# Patient Record
Sex: MALE | Race: WHITE | NOT HISPANIC OR LATINO | URBAN - METROPOLITAN AREA
[De-identification: names, ages, dates, MRNs, and addresses within clinical notes are randomized per-mention and may not be internally consistent; named-entity substitution may affect disease eponyms.]

---

## 2020-07-25 ENCOUNTER — TELEPHONE ENCOUNTER (OUTPATIENT)
Dept: URBAN - METROPOLITAN AREA CLINIC 13 | Facility: CLINIC | Age: 85
End: 2020-07-25

## 2020-07-25 RX ORDER — FAMOTIDINE 40 MG/1
TAKE 1 TABLET TWICE DAILY TABLET ORAL
Qty: 60 | Refills: 3 | OUTPATIENT
Start: 2012-12-07 | End: 2013-04-05

## 2020-07-25 RX ORDER — DIGOXIN 250 UG/1
TAKE 1 TABLET DAILY TABLET ORAL
Refills: 0 | OUTPATIENT
End: 2019-07-31

## 2020-07-25 RX ORDER — GLIMEPIRIDE 4 MG/1
TAKE 1 TABLET DAILY AS DIRECTED TABLET ORAL
Refills: 0 | OUTPATIENT
Start: 2013-01-02 | End: 2019-07-31

## 2020-07-25 RX ORDER — CYCLOBENZAPRINE HYDROCHLORIDE 5 MG/1
TAKE 1 TABLET BY MOUTH AT BEDTIME FOR 7 DAYS TABLET, FILM COATED ORAL
Qty: 7 | Refills: 0 | OUTPATIENT
Start: 2019-07-26 | End: 2019-07-31

## 2020-07-25 RX ORDER — FAMOTIDINE 40 MG/1
TAKE 1 TABLET TWICE DAILY TABLET ORAL
Qty: 180 | Refills: 3 | OUTPATIENT
Start: 2013-02-04 | End: 2015-03-10

## 2020-07-25 RX ORDER — POLYETHYLENE GLYCOL 3350, SODIUM SULFATE, SODIUM CHLORIDE, POTASSIUM CHLORIDE, ASCORBIC ACID, SODIUM ASCORBATE 7.5-2.691G
TAKE 32 OZ AS DIRECTED 5:00PM THE EVENING BEFORE AND 6HR PRIOR TO PROCEDURE KIT ORAL
Qty: 1 | Refills: 0 | OUTPATIENT
Start: 2013-04-24 | End: 2013-05-17

## 2020-07-25 RX ORDER — DEXLANSOPRAZOLE 60 MG/1
TAKE 1 CAPSULE DAILY CAPSULE, DELAYED RELEASE ORAL
Refills: 0 | OUTPATIENT
Start: 2013-04-02 | End: 2015-03-10

## 2020-07-25 RX ORDER — GLIMEPIRIDE 2 MG/1
TAKE 1 TABLET DAILY AS DIRECTED TABLET ORAL
Refills: 0 | OUTPATIENT
End: 2013-02-04

## 2020-07-25 RX ORDER — PANTOPRAZOLE SODIUM 40 MG/1
TAKE 1 TABLET DAILY TABLET, DELAYED RELEASE ORAL
Qty: 90 | Refills: 3 | OUTPATIENT
Start: 2019-07-31 | End: 2019-11-04

## 2020-07-25 RX ORDER — ATENOLOL 50 MG/1
TAKE 1 TABLET DAILY TABLET ORAL
Refills: 0 | OUTPATIENT
End: 2019-07-31

## 2020-07-25 RX ORDER — OMEGA-3-ACID ETHYL ESTERS 1 G/1
TAKE 1 CAPSULE DAILY CAPSULE, LIQUID FILLED ORAL
Refills: 0 | OUTPATIENT
End: 2019-07-31

## 2020-07-25 RX ORDER — BETAMETHASONE DIPROPIONATE 0.5 MG/G
RUB IN AS NEEDED CREAM TOPICAL
Qty: 50 | Refills: 0 | OUTPATIENT
Start: 2013-03-31 | End: 2015-03-10

## 2020-07-25 RX ORDER — AMOXICILLIN AND CLAVULANATE POTASSIUM 875; 125 MG/1; MG/1
TAKE 1 TABLET TWICE DAILY UNTIL GONE TABLET, FILM COATED ORAL
Refills: 0 | OUTPATIENT
Start: 2015-03-05 | End: 2015-03-26

## 2020-07-25 RX ORDER — POLYETHYLENE GLYCOL 3350, SODIUM CHLORIDE, SODIUM BICARBONATE AND POTASSIUM CHLORIDE WITH LEMON FLAVOR 420; 11.2; 5.72; 1.48 G/4L; G/4L; G/4L; G/4L
USE AS DIRECTED POWDER, FOR SOLUTION ORAL
Qty: 1 | Refills: 0 | OUTPATIENT
Start: 2019-07-31 | End: 2019-08-05

## 2020-07-25 RX ORDER — OMEPRAZOLE 40 MG/1
TAKE 1 CAPSULE DAILY CAPSULE, DELAYED RELEASE ORAL
Qty: 30 | Refills: 5 | OUTPATIENT
Start: 2012-09-17 | End: 2012-11-07

## 2020-07-25 RX ORDER — POLYETHYLENE GLYCOL 3350, SODIUM SULFATE, SODIUM CHLORIDE, POTASSIUM CHLORIDE, ASCORBIC ACID, SODIUM ASCORBATE 7.5-2.691G
USE AS DIRECTED KIT ORAL
Qty: 1 | Refills: 0 | OUTPATIENT
Start: 2012-09-17 | End: 2012-11-07

## 2020-07-26 ENCOUNTER — TELEPHONE ENCOUNTER (OUTPATIENT)
Dept: URBAN - METROPOLITAN AREA CLINIC 13 | Facility: CLINIC | Age: 85
End: 2020-07-26

## 2020-07-26 RX ORDER — AZITHROMYCIN DIHYDRATE 250 MG/1
TABLET, FILM COATED ORAL
Qty: 6 | Refills: 0 | Status: ACTIVE | COMMUNITY
Start: 2018-12-17

## 2020-07-26 RX ORDER — FUROSEMIDE 40 MG/1
TABLET ORAL
Qty: 180 | Refills: 0 | Status: ACTIVE | COMMUNITY
Start: 2012-02-17

## 2020-07-26 RX ORDER — BROMPHENIRAMINE MALEATE, PSEUDOEPHEDRINE HYDROCHLORIDE, 2; 30; 10 MG/5ML; MG/5ML; MG/5ML
SYRUP ORAL
Qty: 240 | Refills: 0 | Status: ACTIVE | COMMUNITY
Start: 2018-12-17

## 2020-07-26 RX ORDER — OMEGA-3-ACID ETHYL ESTERS 1 G/1
CAPSULE, LIQUID FILLED ORAL
Qty: 255 | Refills: 0 | Status: ACTIVE | COMMUNITY
Start: 2011-11-19

## 2020-07-26 RX ORDER — ASPIRIN 81 MG/1
TAKE 1 TABLET DAILY AS DIRECTED TABLET, DELAYED RELEASE ORAL
Refills: 0 | Status: ACTIVE | COMMUNITY

## 2020-07-26 RX ORDER — COLESEVELAM HYDROCHLORIDE 3.75 G/1
TAKE 1 PACKET DAILY FOR SUSPENSION ORAL
Refills: 0 | Status: ACTIVE | COMMUNITY

## 2020-07-26 RX ORDER — SOTALOL HYDROCHLORIDE 120 MG/1
TAKE 1 TABLET TWICE DAILY TABLET ORAL
Refills: 0 | Status: ACTIVE | COMMUNITY
Start: 2019-01-20

## 2020-07-26 RX ORDER — FUROSEMIDE 40 MG/1
TAKE 1 TABLET DAILY TABLET ORAL
Refills: 0 | Status: ACTIVE | COMMUNITY

## 2020-07-26 RX ORDER — ATENOLOL 50 MG/1
TABLET ORAL
Qty: 90 | Refills: 0 | Status: ACTIVE | COMMUNITY
Start: 2011-11-28

## 2020-07-26 RX ORDER — COLESEVELAM HYDROCHLORIDE 3.75 G/1
FOR SUSPENSION ORAL
Qty: 90 | Refills: 0 | Status: ACTIVE | COMMUNITY
Start: 2014-06-24

## 2020-07-26 RX ORDER — GLIMEPIRIDE 2 MG/1
TABLET ORAL
Qty: 90 | Refills: 0 | Status: ACTIVE | COMMUNITY
Start: 2011-12-23

## 2020-07-26 RX ORDER — PANTOPRAZOLE SODIUM 40 MG/1
TAKE 1 TABLET DAILY TABLET, DELAYED RELEASE ORAL
Refills: 11 | Status: ACTIVE | COMMUNITY
Start: 2019-11-04

## 2020-07-26 RX ORDER — MELOXICAM 7.5 MG/1
TABLET ORAL
Qty: 180 | Refills: 0 | Status: ACTIVE | COMMUNITY
Start: 2012-01-24

## 2020-07-26 RX ORDER — MELOXICAM 7.5 MG/1
TABLET ORAL
Qty: 180 | Refills: 0 | Status: ACTIVE | COMMUNITY
Start: 2011-09-28

## 2020-07-26 RX ORDER — APIXABAN 5 MG/1
TABLET, FILM COATED ORAL
Refills: 0 | Status: ACTIVE | COMMUNITY
Start: 2019-03-13

## 2020-07-26 RX ORDER — MELOXICAM 7.5 MG/1
TABLET ORAL
Qty: 180 | Refills: 0 | Status: ACTIVE | COMMUNITY
Start: 2012-04-26

## 2020-08-31 ENCOUNTER — TELEPHONE ENCOUNTER (OUTPATIENT)
Dept: URBAN - METROPOLITAN AREA CLINIC 113 | Facility: CLINIC | Age: 85
End: 2020-08-31

## 2020-08-31 RX ORDER — PANTOPRAZOLE SODIUM 40 MG/1
TAKE 1 TABLET DAILY TABLET, DELAYED RELEASE ORAL ONCE A DAY
Qty: 90 | Refills: 1
Start: 2019-11-04

## 2021-03-04 ENCOUNTER — ERX REFILL RESPONSE (OUTPATIENT)
Dept: URBAN - METROPOLITAN AREA CLINIC 107 | Facility: CLINIC | Age: 86
End: 2021-03-04

## 2021-03-04 RX ORDER — PANTOPRAZOLE SODIUM 40 MG/1
1 TABLET TABLET, DELAYED RELEASE ORAL ONCE A DAY
Qty: 90 TABLET | Refills: 1

## 2021-08-29 ENCOUNTER — ERX REFILL RESPONSE (OUTPATIENT)
Dept: URBAN - METROPOLITAN AREA CLINIC 107 | Facility: CLINIC | Age: 86
End: 2021-08-29

## 2021-08-29 RX ORDER — PANTOPRAZOLE SODIUM 40 MG/1
1 TABLET TABLET, DELAYED RELEASE ORAL ONCE A DAY
Qty: 90 TABLET | Refills: 1 | OUTPATIENT

## 2021-08-29 RX ORDER — PANTOPRAZOLE SODIUM 40 MG/1
TAKE 1 TABLET EVERY DAY TABLET, DELAYED RELEASE ORAL
Qty: 90 TABLET | Refills: 1 | OUTPATIENT

## 2022-12-12 ENCOUNTER — OFFICE VISIT (OUTPATIENT)
Dept: URBAN - METROPOLITAN AREA CLINIC 113 | Facility: CLINIC | Age: 87
End: 2022-12-12
Payer: MEDICARE

## 2022-12-12 ENCOUNTER — LAB OUTSIDE AN ENCOUNTER (OUTPATIENT)
Dept: URBAN - METROPOLITAN AREA CLINIC 113 | Facility: CLINIC | Age: 87
End: 2022-12-12

## 2022-12-12 ENCOUNTER — WEB ENCOUNTER (OUTPATIENT)
Dept: URBAN - METROPOLITAN AREA CLINIC 113 | Facility: CLINIC | Age: 87
End: 2022-12-12

## 2022-12-12 VITALS
BODY MASS INDEX: 32.13 KG/M2 | HEIGHT: 68 IN | TEMPERATURE: 97.5 F | WEIGHT: 212 LBS | SYSTOLIC BLOOD PRESSURE: 132 MMHG | RESPIRATION RATE: 16 BRPM | DIASTOLIC BLOOD PRESSURE: 62 MMHG | HEART RATE: 50 BPM

## 2022-12-12 DIAGNOSIS — Z79.01 CHRONIC ANTICOAGULATION: ICD-10-CM

## 2022-12-12 DIAGNOSIS — K57.30 DIVERTICULOSIS LARGE INTESTINE W/O PERFORATION OR ABSCESS W/O BLEEDING: ICD-10-CM

## 2022-12-12 DIAGNOSIS — C16.0 ADENOCARCINOMA OF GASTRIC CARDIA: ICD-10-CM

## 2022-12-12 DIAGNOSIS — Z86.010 HISTORY OF ADENOMATOUS POLYP OF COLON: ICD-10-CM

## 2022-12-12 PROCEDURE — 99203 OFFICE O/P NEW LOW 30 MIN: CPT | Performed by: INTERNAL MEDICINE

## 2022-12-12 RX ORDER — ASPIRIN 81 MG/1
TAKE 1 TABLET DAILY AS DIRECTED TABLET, DELAYED RELEASE ORAL
Refills: 0 | Status: ACTIVE | COMMUNITY

## 2022-12-12 RX ORDER — OMEGA-3-ACID ETHYL ESTERS 1 G/1
CAPSULE, LIQUID FILLED ORAL
Qty: 255 | Refills: 0 | Status: ON HOLD | COMMUNITY
Start: 2011-11-19

## 2022-12-12 RX ORDER — SOTALOL HYDROCHLORIDE 120 MG/1
TAKE 1 TABLET TWICE DAILY TABLET ORAL
Refills: 0 | Status: ACTIVE | COMMUNITY
Start: 2019-01-20

## 2022-12-12 RX ORDER — GLIMEPIRIDE 2 MG/1
TABLET ORAL
Qty: 90 | Refills: 0 | Status: ON HOLD | COMMUNITY
Start: 2011-12-23

## 2022-12-12 RX ORDER — ATENOLOL 50 MG/1
TABLET ORAL
Qty: 90 | Refills: 0 | Status: ON HOLD | COMMUNITY
Start: 2011-11-28

## 2022-12-12 RX ORDER — BROMPHENIRAMINE MALEATE, PSEUDOEPHEDRINE HYDROCHLORIDE, 2; 30; 10 MG/5ML; MG/5ML; MG/5ML
SYRUP ORAL
Qty: 240 | Refills: 0 | Status: ON HOLD | COMMUNITY
Start: 2018-12-17

## 2022-12-12 RX ORDER — METFORMIN HYDROCHLORIDE 500 MG/1
1 TABLET WITH A MEAL TABLET, FILM COATED ORAL ONCE A DAY
Status: ACTIVE | COMMUNITY

## 2022-12-12 RX ORDER — PANTOPRAZOLE SODIUM 40 MG/1
TAKE 1 TABLET EVERY DAY TABLET, DELAYED RELEASE ORAL
Qty: 90 TABLET | Refills: 1 | Status: ACTIVE | COMMUNITY

## 2022-12-12 RX ORDER — APIXABAN 5 MG/1
TABLET, FILM COATED ORAL
Refills: 0 | Status: ACTIVE | COMMUNITY
Start: 2019-03-13

## 2022-12-12 RX ORDER — COLESEVELAM HYDROCHLORIDE 3.75 G/1
TAKE 1 PACKET DAILY FOR SUSPENSION ORAL
Refills: 0 | Status: ON HOLD | COMMUNITY

## 2022-12-12 RX ORDER — OMEGA-3 FATTY ACIDS/FISH OIL 300-1000MG
1 CAPSULE CAPSULE ORAL ONCE A DAY
Status: ACTIVE | COMMUNITY

## 2022-12-12 RX ORDER — MELOXICAM 7.5 MG/1
TABLET ORAL
Qty: 180 | Refills: 0 | Status: ON HOLD | COMMUNITY
Start: 2011-09-28

## 2022-12-12 RX ORDER — FUROSEMIDE 40 MG/1
TAKE 1 TABLET DAILY TABLET ORAL
Refills: 0 | Status: ACTIVE | COMMUNITY

## 2022-12-12 RX ORDER — AZITHROMYCIN DIHYDRATE 250 MG/1
TABLET, FILM COATED ORAL
Qty: 6 | Refills: 0 | Status: ON HOLD | COMMUNITY
Start: 2018-12-17

## 2022-12-12 NOTE — HPI-OTHER HISTORIES
EGD on 8/7/2019 revealed a normal esophagus and Z-line at 42 cm.  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  The duodenum was normal.  Colonoscopy on 8/5/2019 revealed pancolonic diverticulosis, small grade 2 internal hemorrhoids, no colon polyps.

## 2022-12-12 NOTE — HPI-TODAY'S VISIT:
86-year-old with a history of atrial fibrillation on Eliquis, hypertension, hyperlipidemia, chronic kidney disease stage III, neuropathy, history of adenomatous colon polyps and abdominal aortic aneurysm 4.1 cm in size and diabetes mellitus.  Upper endoscopy on 8/7/2019 revealed small erythematous area 1.5 cm in size in the gastric cardia below the GE junction biopsies showed moderately differentiated adenocarcinoma.  He subsequently underwent an endoscopic mucosal dissection at the [facility] with Dr. Hernandez.  This revealed a 1.5 cm GE junctional adenocarcinoma/cardia margins were negative.  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  He underwent repeat surveillance and endoscopic ultrasound examination on 1/31/2020 multiple biopsies of the region were negative there was a 12 mm lymph node adjacent to the GE junction needle biopsy revealed reactive lymphocytosis. Blood work on 8/1/2022 revealed hemoglobin 11, WBC of 7.3 and platelet count 217,000.  Sodium 143 potassium 4.7 BUN 46 creatinine 1.9.  AST 16, ALT 19, alkaline phosphatase 82, total bili 0.3, TSH is 4.33.

## 2023-02-08 ENCOUNTER — OFFICE VISIT (OUTPATIENT)
Dept: URBAN - METROPOLITAN AREA SURGERY CENTER 25 | Facility: SURGERY CENTER | Age: 88
End: 2023-02-08
Payer: MEDICARE

## 2023-02-08 ENCOUNTER — CLAIMS CREATED FROM THE CLAIM WINDOW (OUTPATIENT)
Dept: URBAN - METROPOLITAN AREA CLINIC 4 | Facility: CLINIC | Age: 88
End: 2023-02-08
Payer: MEDICARE

## 2023-02-08 DIAGNOSIS — Z09 EXAMINATION FOR, FOLLOW-UP: ICD-10-CM

## 2023-02-08 DIAGNOSIS — K31.89 OTHER DISEASES OF STOMACH AND DUODENUM: ICD-10-CM

## 2023-02-08 DIAGNOSIS — D50.9 ANEMIA, IRON DEFICIENCY: ICD-10-CM

## 2023-02-08 DIAGNOSIS — Z85.028 HISTORY OF GASTRIC CANCER: ICD-10-CM

## 2023-02-08 DIAGNOSIS — K31.89 GASTRIC FOVEOLAR HYPERPLASIA: ICD-10-CM

## 2023-02-08 PROCEDURE — 88305 TISSUE EXAM BY PATHOLOGIST: CPT | Performed by: PATHOLOGY

## 2023-02-08 PROCEDURE — 43239 EGD BIOPSY SINGLE/MULTIPLE: CPT | Performed by: INTERNAL MEDICINE

## 2023-02-08 PROCEDURE — 88312 SPECIAL STAINS GROUP 1: CPT | Performed by: PATHOLOGY

## 2023-02-08 PROCEDURE — G8907 PT DOC NO EVENTS ON DISCHARG: HCPCS | Performed by: INTERNAL MEDICINE

## 2023-02-08 RX ORDER — COLESEVELAM HYDROCHLORIDE 3.75 G/1
TAKE 1 PACKET DAILY FOR SUSPENSION ORAL
Refills: 0 | Status: ON HOLD | COMMUNITY

## 2023-02-08 RX ORDER — PANTOPRAZOLE SODIUM 40 MG/1
TAKE 1 TABLET EVERY DAY TABLET, DELAYED RELEASE ORAL
Qty: 90 TABLET | Refills: 1 | Status: ACTIVE | COMMUNITY

## 2023-02-08 RX ORDER — ATENOLOL 50 MG/1
TABLET ORAL
Qty: 90 | Refills: 0 | Status: ON HOLD | COMMUNITY
Start: 2011-11-28

## 2023-02-08 RX ORDER — FUROSEMIDE 40 MG/1
TAKE 1 TABLET DAILY TABLET ORAL
Refills: 0 | Status: ACTIVE | COMMUNITY

## 2023-02-08 RX ORDER — METFORMIN HYDROCHLORIDE 500 MG/1
1 TABLET WITH A MEAL TABLET, FILM COATED ORAL ONCE A DAY
Status: ACTIVE | COMMUNITY

## 2023-02-08 RX ORDER — BROMPHENIRAMINE MALEATE, PSEUDOEPHEDRINE HYDROCHLORIDE, 2; 30; 10 MG/5ML; MG/5ML; MG/5ML
SYRUP ORAL
Qty: 240 | Refills: 0 | Status: ON HOLD | COMMUNITY
Start: 2018-12-17

## 2023-02-08 RX ORDER — GLIMEPIRIDE 2 MG/1
TABLET ORAL
Qty: 90 | Refills: 0 | Status: ON HOLD | COMMUNITY
Start: 2011-12-23

## 2023-02-08 RX ORDER — OMEGA-3 FATTY ACIDS/FISH OIL 300-1000MG
1 CAPSULE CAPSULE ORAL ONCE A DAY
Status: ACTIVE | COMMUNITY

## 2023-02-08 RX ORDER — OMEGA-3-ACID ETHYL ESTERS 1 G/1
CAPSULE, LIQUID FILLED ORAL
Qty: 255 | Refills: 0 | Status: ON HOLD | COMMUNITY
Start: 2011-11-19

## 2023-02-08 RX ORDER — APIXABAN 5 MG/1
TABLET, FILM COATED ORAL
Refills: 0 | Status: ACTIVE | COMMUNITY
Start: 2019-03-13

## 2023-02-08 RX ORDER — AZITHROMYCIN DIHYDRATE 250 MG/1
TABLET, FILM COATED ORAL
Qty: 6 | Refills: 0 | Status: ON HOLD | COMMUNITY
Start: 2018-12-17

## 2023-02-08 RX ORDER — SOTALOL HYDROCHLORIDE 120 MG/1
TAKE 1 TABLET TWICE DAILY TABLET ORAL
Refills: 0 | Status: ACTIVE | COMMUNITY
Start: 2019-01-20

## 2023-02-08 RX ORDER — MELOXICAM 7.5 MG/1
TABLET ORAL
Qty: 180 | Refills: 0 | Status: ON HOLD | COMMUNITY
Start: 2011-09-28

## 2023-02-08 RX ORDER — ASPIRIN 81 MG/1
TAKE 1 TABLET DAILY AS DIRECTED TABLET, DELAYED RELEASE ORAL
Refills: 0 | Status: ACTIVE | COMMUNITY

## 2023-02-27 PROBLEM — 87522002 IRON DEFICIENCY ANEMIA: Status: ACTIVE | Noted: 2023-02-27

## 2023-02-27 PROBLEM — 473061005 HISTORY OF MALIGNANT NEOPLASM OF STOMACH: Status: ACTIVE | Noted: 2023-02-27

## 2023-02-28 PROBLEM — 429047008: Status: ACTIVE | Noted: 2022-12-11

## 2023-02-28 PROBLEM — 711150003: Status: ACTIVE | Noted: 2022-12-12

## 2023-02-28 PROBLEM — 408647009: Status: ACTIVE | Noted: 2022-12-11

## 2023-02-28 PROBLEM — 724538004: Status: ACTIVE | Noted: 2022-12-11

## 2023-03-01 ENCOUNTER — OFFICE VISIT (OUTPATIENT)
Dept: URBAN - METROPOLITAN AREA CLINIC 107 | Facility: CLINIC | Age: 88
End: 2023-03-01
Payer: MEDICARE

## 2023-03-01 VITALS
HEART RATE: 53 BPM | WEIGHT: 211 LBS | HEIGHT: 68 IN | TEMPERATURE: 97.7 F | BODY MASS INDEX: 31.98 KG/M2 | DIASTOLIC BLOOD PRESSURE: 63 MMHG | SYSTOLIC BLOOD PRESSURE: 112 MMHG

## 2023-03-01 DIAGNOSIS — K57.30 DIVERTICULOSIS LARGE INTESTINE W/O PERFORATION OR ABSCESS W/O BLEEDING: ICD-10-CM

## 2023-03-01 DIAGNOSIS — Z86.010 HISTORY OF ADENOMATOUS POLYP OF COLON: ICD-10-CM

## 2023-03-01 DIAGNOSIS — Z79.01 CHRONIC ANTICOAGULATION: ICD-10-CM

## 2023-03-01 DIAGNOSIS — C16.0 ADENOCARCINOMA OF GASTRIC CARDIA: ICD-10-CM

## 2023-03-01 PROCEDURE — 99212 OFFICE O/P EST SF 10 MIN: CPT | Performed by: INTERNAL MEDICINE

## 2023-03-01 RX ORDER — ASPIRIN 81 MG/1
TAKE 1 TABLET DAILY AS DIRECTED TABLET, DELAYED RELEASE ORAL
Refills: 0 | Status: ACTIVE | COMMUNITY

## 2023-03-01 RX ORDER — OMEGA-3 FATTY ACIDS/FISH OIL 300-1000MG
1 CAPSULE CAPSULE ORAL ONCE A DAY
Status: ACTIVE | COMMUNITY

## 2023-03-01 RX ORDER — PANTOPRAZOLE SODIUM 40 MG/1
TAKE 1 TABLET EVERY DAY TABLET, DELAYED RELEASE ORAL
Qty: 90 TABLET | Refills: 1 | Status: ACTIVE | COMMUNITY

## 2023-03-01 RX ORDER — FUROSEMIDE 40 MG/1
TAKE 1 TABLET DAILY TABLET ORAL
Refills: 0 | Status: ACTIVE | COMMUNITY

## 2023-03-01 RX ORDER — AZITHROMYCIN DIHYDRATE 250 MG/1
TABLET, FILM COATED ORAL
Qty: 6 | Refills: 0 | Status: ON HOLD | COMMUNITY
Start: 2018-12-17

## 2023-03-01 RX ORDER — COLESEVELAM HYDROCHLORIDE 3.75 G/1
TAKE 1 PACKET DAILY FOR SUSPENSION ORAL
Refills: 0 | Status: ON HOLD | COMMUNITY

## 2023-03-01 RX ORDER — GLIMEPIRIDE 2 MG/1
TABLET ORAL
Qty: 90 | Refills: 0 | Status: ON HOLD | COMMUNITY
Start: 2011-12-23

## 2023-03-01 RX ORDER — BROMPHENIRAMINE MALEATE, PSEUDOEPHEDRINE HYDROCHLORIDE, 2; 30; 10 MG/5ML; MG/5ML; MG/5ML
SYRUP ORAL
Qty: 240 | Refills: 0 | Status: ON HOLD | COMMUNITY
Start: 2018-12-17

## 2023-03-01 RX ORDER — METFORMIN HYDROCHLORIDE 500 MG/1
1 TABLET WITH A MEAL TABLET, FILM COATED ORAL ONCE A DAY
Status: ACTIVE | COMMUNITY

## 2023-03-01 RX ORDER — SOTALOL HYDROCHLORIDE 120 MG/1
TAKE 1 TABLET TWICE DAILY TABLET ORAL
Refills: 0 | Status: ACTIVE | COMMUNITY
Start: 2019-01-20

## 2023-03-01 RX ORDER — MELOXICAM 7.5 MG/1
TABLET ORAL
Qty: 180 | Refills: 0 | Status: ON HOLD | COMMUNITY
Start: 2011-09-28

## 2023-03-01 RX ORDER — APIXABAN 5 MG/1
TABLET, FILM COATED ORAL
Refills: 0 | Status: ACTIVE | COMMUNITY
Start: 2019-03-13

## 2023-03-01 RX ORDER — OMEGA-3-ACID ETHYL ESTERS 1 G/1
CAPSULE, LIQUID FILLED ORAL
Qty: 255 | Refills: 0 | Status: ON HOLD | COMMUNITY
Start: 2011-11-19

## 2023-03-01 RX ORDER — ATENOLOL 50 MG/1
TABLET ORAL
Qty: 90 | Refills: 0 | Status: ON HOLD | COMMUNITY
Start: 2011-11-28

## 2023-03-01 NOTE — HPI-OTHER HISTORIES
EGD on 2/8/23 revealed a normal esophagus and Z line at 42 cm.  There is some erythema below the GE junction biopsies revealed proton pump inhibitor effect with no intestinal metaplasia or dysplasia.  There is some erythema of the gastric antrum and body biopsies revealed foveolar hyperplasia.  There is no H. pylori.  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  The duodenum was normal.  EGD on 8/7/2019 revealed a normal esophagus and Z-line at 42 cm.  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  The duodenum was normal.  Colonoscopy on 8/5/2019 revealed pancolonic diverticulosis, small grade 2 internal hemorrhoids, no colon polyps.

## 2023-03-03 ENCOUNTER — OFFICE VISIT (OUTPATIENT)
Dept: URBAN - METROPOLITAN AREA CLINIC 107 | Facility: CLINIC | Age: 88
End: 2023-03-03

## 2023-07-20 NOTE — HPI-TODAY'S VISIT:
87-year-old with a history of atrial fibrillation on Eliquis, hypertension, hyperlipidemia, chronic kidney disease stage III, neuropathy, history of adenomatous colon polyps and abdominal aortic aneurysm 4.1 cm in size and diabetes mellitus.  Upper endoscopy on 8/7/2019 revealed small erythematous area 1.5 cm in size in the gastric cardia below the GE junction biopsies showed moderately differentiated adenocarcinoma.  He subsequently underwent an endoscopic mucosal dissection at the [facility] with Dr. Hernandez.  This revealed a 1.5 cm GE junctional adenocarcinoma/cardia margins were negative.  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  He underwent repeat surveillance and endoscopic ultrasound examination on 1/31/2020 multiple biopsies of the region were negative there was a 12 mm lymph node adjacent to the GE junction needle biopsy revealed reactive lymphocytosis.  He feels well.  He denies any heartburn, dysphagia or abdominal pain.  There is no nausea or vomiting.  He has a bowel movement every day there's been no blood or melena. Blood work on 8/1/2022 revealed hemoglobin 11, WBC of 7.3 and platelet count 217,000.  Sodium 143 potassium 4.7 BUN 46 creatinine 1.9.  AST 16, ALT 19, alkaline phosphatase 82, total bili 0.3, TSH is 4.33. Medications  Glipizide 5mg twice daily  Ozempic 1mg/weekly (Saturdays)    Blood Sugar  Checking with Dexcom G7     Follow Up  Thurs 7/27/2023 at 11:30am: Lisa Patton (diabetes educator)   *Bring Dexcom  to appt

## 2024-01-16 ENCOUNTER — DASHBOARD ENCOUNTERS (OUTPATIENT)
Age: 89
End: 2024-01-16

## 2024-01-16 ENCOUNTER — OFFICE VISIT (OUTPATIENT)
Dept: URBAN - METROPOLITAN AREA CLINIC 107 | Facility: CLINIC | Age: 89
End: 2024-01-16
Payer: MEDICARE

## 2024-01-16 VITALS
HEART RATE: 58 BPM | DIASTOLIC BLOOD PRESSURE: 71 MMHG | RESPIRATION RATE: 16 BRPM | BODY MASS INDEX: 30.92 KG/M2 | SYSTOLIC BLOOD PRESSURE: 119 MMHG | HEIGHT: 68 IN | WEIGHT: 204 LBS | TEMPERATURE: 98.2 F

## 2024-01-16 DIAGNOSIS — K57.30 DIVERTICULOSIS LARGE INTESTINE W/O PERFORATION OR ABSCESS W/O BLEEDING: ICD-10-CM

## 2024-01-16 DIAGNOSIS — Z79.01 CHRONIC ANTICOAGULATION: ICD-10-CM

## 2024-01-16 DIAGNOSIS — C16.0 ADENOCARCINOMA OF GASTRIC CARDIA: ICD-10-CM

## 2024-01-16 DIAGNOSIS — Z86.010 HISTORY OF ADENOMATOUS POLYP OF COLON: ICD-10-CM

## 2024-01-16 PROCEDURE — 99213 OFFICE O/P EST LOW 20 MIN: CPT | Performed by: INTERNAL MEDICINE

## 2024-01-16 RX ORDER — FUROSEMIDE 40 MG/1
TAKE 1 TABLET DAILY TABLET ORAL
Refills: 0 | Status: ACTIVE | COMMUNITY

## 2024-01-16 RX ORDER — DAPAGLIFLOZIN 10 MG/1
1 TABLET TABLET, FILM COATED ORAL ONCE A DAY
Status: ACTIVE | COMMUNITY

## 2024-01-16 RX ORDER — ATENOLOL 50 MG/1
TABLET ORAL
Qty: 90 | Refills: 0 | Status: ON HOLD | COMMUNITY
Start: 2011-11-28

## 2024-01-16 RX ORDER — APIXABAN 5 MG/1
TABLET, FILM COATED ORAL
Refills: 0 | Status: ACTIVE | COMMUNITY
Start: 2019-03-13

## 2024-01-16 RX ORDER — ASPIRIN 81 MG/1
TAKE 1 TABLET DAILY AS DIRECTED TABLET, DELAYED RELEASE ORAL
Refills: 0 | Status: ACTIVE | COMMUNITY

## 2024-01-16 RX ORDER — GLIMEPIRIDE 2 MG/1
TABLET ORAL
Qty: 90 | Refills: 0 | Status: ON HOLD | COMMUNITY
Start: 2011-12-23

## 2024-01-16 RX ORDER — SOTALOL HYDROCHLORIDE 120 MG/1
TAKE 1 TABLET TWICE DAILY TABLET ORAL
Refills: 0 | Status: ACTIVE | COMMUNITY
Start: 2019-01-20

## 2024-01-16 RX ORDER — BROMPHENIRAMINE MALEATE, PSEUDOEPHEDRINE HYDROCHLORIDE, 2; 30; 10 MG/5ML; MG/5ML; MG/5ML
SYRUP ORAL
Qty: 240 | Refills: 0 | Status: ON HOLD | COMMUNITY
Start: 2018-12-17

## 2024-01-16 RX ORDER — OMEGA-3-ACID ETHYL ESTERS 1 G/1
CAPSULE, LIQUID FILLED ORAL
Qty: 255 | Refills: 0 | Status: ON HOLD | COMMUNITY
Start: 2011-11-19

## 2024-01-16 RX ORDER — GABAPENTIN 100 MG/1
1 CAPSULE CAPSULE ORAL ONCE A DAY
Status: ACTIVE | COMMUNITY

## 2024-01-16 RX ORDER — PANTOPRAZOLE SODIUM 40 MG/1
TAKE 1 TABLET EVERY DAY TABLET, DELAYED RELEASE ORAL
Qty: 90 TABLET | Refills: 1 | Status: ACTIVE | COMMUNITY

## 2024-01-16 RX ORDER — METFORMIN HYDROCHLORIDE 500 MG/1
1 TABLET WITH A MEAL TABLET, FILM COATED ORAL ONCE A DAY
Status: ON HOLD | COMMUNITY

## 2024-01-16 RX ORDER — COLESEVELAM HYDROCHLORIDE 3.75 G/1
TAKE 1 PACKET DAILY FOR SUSPENSION ORAL
Refills: 0 | Status: ON HOLD | COMMUNITY

## 2024-01-16 RX ORDER — OMEGA-3 FATTY ACIDS/FISH OIL 300-1000MG
1 CAPSULE CAPSULE ORAL ONCE A DAY
Status: ACTIVE | COMMUNITY

## 2024-01-16 RX ORDER — AZITHROMYCIN DIHYDRATE 250 MG/1
TABLET, FILM COATED ORAL
Qty: 6 | Refills: 0 | Status: ON HOLD | COMMUNITY
Start: 2018-12-17

## 2024-01-16 RX ORDER — MELOXICAM 7.5 MG/1
TABLET ORAL
Qty: 180 | Refills: 0 | Status: ON HOLD | COMMUNITY
Start: 2011-09-28

## 2024-01-16 NOTE — HPI-TODAY'S VISIT:
88-year-old with a history of atrial fibrillation on Eliquis, hypertension, hyperlipidemia, chronic kidney disease stage III, neuropathy, history of adenomatous colon polyps and abdominal aortic aneurysm 4.1 cm in size and diabetes mellitus.  Upper endoscopy on 8/7/2019 revealed small erythematous area 1.5 cm in size in the gastric cardia below the GE junction biopsies showed moderately differentiated adenocarcinoma.  He subsequently underwent an endoscopic mucosal dissection at the [facility] with Dr. Hernandez.  This revealed a 1.5 cm GE junctional adenocarcinoma/cardia margins were negative.  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  He underwent repeat surveillance and endoscopic ultrasound examination on 1/31/2020 multiple biopsies of the region were negative there was a 12 mm lymph node adjacent to the GE junction needle biopsy revealed reactive lymphocytosis.  He has some back pain and is currently using a cane.  He had a syncopal episode and was in the emergency room and hit his head.  They released him.  Possibly related to blood pressure and orthostasis they said.  He denies any heartburn or dysphagia or abdominal pain.  He does have occasional gaseousness including burping and flatus.  He typically moves his bowels every day there is been no blood or melena.  He denies any nausea or vomiting.  He has lost weight but that has been purposeful.  Blood work on 12/9/2023 revealed a hemoglobin 9.7, WBC 9.9 and platelet count 239,000.  Sodium 139 potassium 4 creatinine 2.08.  Alcohol level is 75.  Blood work on 8/15/2023 revealed an AST 20 ALT 13 alkaline phosphatase 90 total bili 0.5 PT/INR is 1.27.  TSH is 4.84 Blood work on 8/1/2022 revealed hemoglobin 11, WBC of 7.3 and platelet count 217,000.  Sodium 143 potassium 4.7 BUN 46 creatinine 1.9.  AST 16, ALT 19, alkaline phosphatase 82, total bili 0.3, TSH is 4.33.

## 2024-06-17 ENCOUNTER — OFFICE VISIT (OUTPATIENT)
Dept: URBAN - METROPOLITAN AREA CLINIC 107 | Facility: CLINIC | Age: 89
End: 2024-06-17
Payer: MEDICARE

## 2024-06-17 VITALS
HEART RATE: 54 BPM | TEMPERATURE: 97.5 F | SYSTOLIC BLOOD PRESSURE: 124 MMHG | BODY MASS INDEX: 30.68 KG/M2 | DIASTOLIC BLOOD PRESSURE: 50 MMHG | HEIGHT: 68 IN | WEIGHT: 202.4 LBS

## 2024-06-17 DIAGNOSIS — C16.0 ADENOCARCINOMA OF GASTRIC CARDIA: ICD-10-CM

## 2024-06-17 DIAGNOSIS — Z86.010 HISTORY OF ADENOMATOUS POLYP OF COLON: ICD-10-CM

## 2024-06-17 DIAGNOSIS — K57.30 DIVERTICULOSIS LARGE INTESTINE W/O PERFORATION OR ABSCESS W/O BLEEDING: ICD-10-CM

## 2024-06-17 DIAGNOSIS — Z79.01 CHRONIC ANTICOAGULATION: ICD-10-CM

## 2024-06-17 PROCEDURE — 99213 OFFICE O/P EST LOW 20 MIN: CPT | Performed by: INTERNAL MEDICINE

## 2024-06-17 RX ORDER — AZITHROMYCIN DIHYDRATE 250 MG/1
TABLET, FILM COATED ORAL
Qty: 6 | Refills: 0 | Status: ON HOLD | COMMUNITY
Start: 2018-12-17

## 2024-06-17 RX ORDER — ASPIRIN 81 MG/1
TAKE 1 TABLET DAILY AS DIRECTED TABLET, DELAYED RELEASE ORAL
Refills: 0 | Status: ACTIVE | COMMUNITY

## 2024-06-17 RX ORDER — SOTALOL HYDROCHLORIDE 120 MG/1
TAKE 1 TABLET TWICE DAILY TABLET ORAL
Refills: 0 | Status: ACTIVE | COMMUNITY
Start: 2019-01-20

## 2024-06-17 RX ORDER — PANTOPRAZOLE SODIUM 40 MG/1
TAKE 1 TABLET EVERY DAY TABLET, DELAYED RELEASE ORAL
Qty: 90 TABLET | Refills: 1 | Status: ACTIVE | COMMUNITY

## 2024-06-17 RX ORDER — FUROSEMIDE 40 MG/1
TAKE 1 TABLET DAILY TABLET ORAL
Refills: 0 | Status: ACTIVE | COMMUNITY

## 2024-06-17 RX ORDER — ATENOLOL 50 MG/1
TABLET ORAL
Qty: 90 | Refills: 0 | Status: ON HOLD | COMMUNITY
Start: 2011-11-28

## 2024-06-17 RX ORDER — GLIMEPIRIDE 2 MG/1
TABLET ORAL
Qty: 90 | Refills: 0 | Status: ON HOLD | COMMUNITY
Start: 2011-12-23

## 2024-06-17 RX ORDER — OMEGA-3 FATTY ACIDS/FISH OIL 300-1000MG
1 CAPSULE CAPSULE ORAL ONCE A DAY
Status: ACTIVE | COMMUNITY

## 2024-06-17 RX ORDER — GABAPENTIN 100 MG/1
1 CAPSULE CAPSULE ORAL ONCE A DAY
Status: ON HOLD | COMMUNITY

## 2024-06-17 RX ORDER — COLESEVELAM HYDROCHLORIDE 3.75 G/1
TAKE 1 PACKET DAILY FOR SUSPENSION ORAL
Refills: 0 | Status: ON HOLD | COMMUNITY

## 2024-06-17 RX ORDER — BROMPHENIRAMINE MALEATE, PSEUDOEPHEDRINE HYDROCHLORIDE, 2; 30; 10 MG/5ML; MG/5ML; MG/5ML
SYRUP ORAL
Qty: 240 | Refills: 0 | Status: ON HOLD | COMMUNITY
Start: 2018-12-17

## 2024-06-17 RX ORDER — OMEGA-3-ACID ETHYL ESTERS 1 G/1
CAPSULE, LIQUID FILLED ORAL
Qty: 255 | Refills: 0 | Status: ON HOLD | COMMUNITY
Start: 2011-11-19

## 2024-06-17 RX ORDER — APIXABAN 5 MG/1
TABLET, FILM COATED ORAL
Refills: 0 | Status: ACTIVE | COMMUNITY
Start: 2019-03-13

## 2024-06-17 RX ORDER — MELOXICAM 7.5 MG/1
TABLET ORAL
Qty: 180 | Refills: 0 | Status: ON HOLD | COMMUNITY
Start: 2011-09-28

## 2024-06-17 RX ORDER — METFORMIN HYDROCHLORIDE 500 MG/1
1 TABLET WITH A MEAL TABLET, FILM COATED ORAL ONCE A DAY
Status: ON HOLD | COMMUNITY

## 2024-06-17 RX ORDER — DAPAGLIFLOZIN 10 MG/1
1 TABLET TABLET, FILM COATED ORAL ONCE A DAY
Status: ACTIVE | COMMUNITY

## 2024-06-17 NOTE — HPI-TODAY'S VISIT:
88-year-old with a history of atrial fibrillation on Eliquis, hypertension, hyperlipidemia, chronic kidney disease stage III, neuropathy, history of adenomatous colon polyps and abdominal aortic aneurysm 4.1 cm in size and diabetes mellitus.  Upper endoscopy on 8/7/2019 revealed small erythematous area 1.5 cm in size in the gastric cardia below the GE junction biopsies showed moderately differentiated adenocarcinoma.  He subsequently underwent an endoscopic mucosal dissection at the [facility] with Dr. Hernandez.  This revealed a 1.5 cm GE junctional adenocarcinoma/cardia margins were negative.  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  He underwent repeat surveillance and endoscopic ultrasound examination on 1/31/2020 multiple biopsies of the region were negative there was a 12 mm lymph node adjacent to the GE junction needle biopsy revealed reactive lymphocytosis.  Since his syncopal episode he has had no further episodes.  He did see his cardiologist Dr. Ayala who had no further suggestions at this point he states that his EKG looked stable.  He denies any heartburn, dysphagia or abdominal pain.  There is no nausea or vomiting.  He typically moves his bowels about once or twice a day there is been no melena.  He did notice some bright red blood on the toilet paper only about a month ago.  He has had none since.  Blood work on 6/11/2024 revealed a hemoglobin 11.5, WBC of 7.6 and platelet count 231,000.  MCV is 101.6.  B12 is 561, folate is greater than 24.  Sodium 141 potassium 4.3 BUN 51 creatinine 2.28.  AST 19, ALT 14, alk phosphatase 102, total bili 0.5.  Albumin is 4.4.  A1c is 6.  Blood work on 12/9/2023 revealed a hemoglobin 9.7, WBC 9.9 and platelet count 239,000.  Sodium 139 potassium 4 creatinine 2.08.  Alcohol level is 75.  Blood work on 8/15/2023 revealed an AST 20 ALT 13 alkaline phosphatase 90 total bili 0.5 PT/INR is 1.27.  TSH is 4.84

## 2024-11-04 PROBLEM — 429047008: Status: ACTIVE | Noted: 2024-11-04

## 2024-11-05 ENCOUNTER — OFFICE VISIT (OUTPATIENT)
Dept: URBAN - METROPOLITAN AREA CLINIC 107 | Facility: CLINIC | Age: 89
End: 2024-11-05
Payer: COMMERCIAL

## 2024-11-05 VITALS
BODY MASS INDEX: 30.1 KG/M2 | OXYGEN SATURATION: 92 % | DIASTOLIC BLOOD PRESSURE: 64 MMHG | TEMPERATURE: 97.7 F | RESPIRATION RATE: 18 BRPM | HEIGHT: 68 IN | WEIGHT: 198.6 LBS | HEART RATE: 54 BPM | SYSTOLIC BLOOD PRESSURE: 139 MMHG

## 2024-11-05 DIAGNOSIS — Z79.01 CHRONIC ANTICOAGULATION: ICD-10-CM

## 2024-11-05 DIAGNOSIS — C16.0 ADENOCARCINOMA OF GASTRIC CARDIA: ICD-10-CM

## 2024-11-05 DIAGNOSIS — K57.30 DIVERTICULOSIS LARGE INTESTINE W/O PERFORATION OR ABSCESS W/O BLEEDING: ICD-10-CM

## 2024-11-05 DIAGNOSIS — Z86.0101 HISTORY OF ADENOMATOUS POLYP OF COLON: ICD-10-CM

## 2024-11-05 PROCEDURE — 99212 OFFICE O/P EST SF 10 MIN: CPT | Performed by: INTERNAL MEDICINE

## 2024-11-05 RX ORDER — AZITHROMYCIN DIHYDRATE 250 MG/1
TABLET, FILM COATED ORAL
Qty: 6 | Refills: 0 | Status: DISCONTINUED | COMMUNITY
Start: 2018-12-17

## 2024-11-05 RX ORDER — SOTALOL HYDROCHLORIDE 120 MG/1
TAKE 1 TABLET TWICE DAILY TABLET ORAL
Refills: 0 | Status: ACTIVE | COMMUNITY
Start: 2019-01-20

## 2024-11-05 RX ORDER — FUROSEMIDE 40 MG/1
TAKE 1 TABLET DAILY TABLET ORAL
Refills: 0 | Status: DISCONTINUED | COMMUNITY

## 2024-11-05 RX ORDER — ASPIRIN 81 MG/1
TAKE 1 TABLET DAILY AS DIRECTED TABLET, DELAYED RELEASE ORAL
Refills: 0 | Status: ACTIVE | COMMUNITY

## 2024-11-05 RX ORDER — MELOXICAM 7.5 MG/1
TABLET ORAL
Qty: 180 | Refills: 0 | Status: DISCONTINUED | COMMUNITY
Start: 2011-09-28

## 2024-11-05 RX ORDER — APIXABAN 5 MG/1
TABLET, FILM COATED ORAL
Refills: 0 | Status: ACTIVE | COMMUNITY
Start: 2019-03-13

## 2024-11-05 RX ORDER — GABAPENTIN 100 MG/1
1 CAPSULE CAPSULE ORAL ONCE A DAY
Status: DISCONTINUED | COMMUNITY

## 2024-11-05 RX ORDER — COLESEVELAM HYDROCHLORIDE 3.75 G/1
TAKE 1 PACKET DAILY FOR SUSPENSION ORAL
Refills: 0 | Status: DISCONTINUED | COMMUNITY

## 2024-11-05 RX ORDER — ATENOLOL 50 MG/1
TABLET ORAL
Qty: 90 | Refills: 0 | Status: DISCONTINUED | COMMUNITY
Start: 2011-11-28

## 2024-11-05 RX ORDER — METFORMIN HYDROCHLORIDE 500 MG/1
1 TABLET WITH A MEAL TABLET, FILM COATED ORAL ONCE A DAY
Status: DISCONTINUED | COMMUNITY

## 2024-11-05 RX ORDER — OMEGA-3 FATTY ACIDS/FISH OIL 300-1000MG
1 CAPSULE CAPSULE ORAL ONCE A DAY
Status: ACTIVE | COMMUNITY

## 2024-11-05 RX ORDER — PANTOPRAZOLE SODIUM 40 MG/1
TAKE 1 TABLET EVERY DAY TABLET, DELAYED RELEASE ORAL
Qty: 90 TABLET | Refills: 1 | Status: ACTIVE | COMMUNITY

## 2024-11-05 RX ORDER — BROMPHENIRAMINE MALEATE, PSEUDOEPHEDRINE HYDROCHLORIDE, 2; 30; 10 MG/5ML; MG/5ML; MG/5ML
SYRUP ORAL
Qty: 240 | Refills: 0 | Status: DISCONTINUED | COMMUNITY
Start: 2018-12-17

## 2024-11-05 RX ORDER — GLIMEPIRIDE 2 MG/1
TABLET ORAL
Qty: 90 | Refills: 0 | Status: DISCONTINUED | COMMUNITY
Start: 2011-12-23

## 2024-11-05 RX ORDER — OMEGA-3-ACID ETHYL ESTERS 1 G/1
CAPSULE, LIQUID FILLED ORAL
Qty: 255 | Refills: 0 | Status: DISCONTINUED | COMMUNITY
Start: 2011-11-19

## 2024-11-05 RX ORDER — DAPAGLIFLOZIN 10 MG/1
1 TABLET TABLET, FILM COATED ORAL ONCE A DAY
Status: ACTIVE | COMMUNITY

## 2024-11-05 NOTE — HPI-TODAY'S VISIT:
88-year-old with a history of atrial fibrillation on Eliquis, hypertension, hyperlipidemia, chronic kidney disease stage III, neuropathy, history of adenomatous colon polyps and abdominal aortic aneurysm 4.1 cm in size and diabetes mellitus.  Upper endoscopy on 8/7/2019 revealed small erythematous area 1.5 cm in size in the gastric cardia below the GE junction biopsies showed moderately differentiated adenocarcinoma.  He subsequently underwent an endoscopic mucosal dissection at the [facility] with Dr. Hernandez.  This revealed a 1.5 cm GE junctional adenocarcinoma/cardia margins were negative.  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  He underwent repeat surveillance and endoscopic ultrasound examination on 1/31/2020 multiple biopsies of the region were negative there was a 12 mm lymph node adjacent to the GE junction needle biopsy revealed reactive lymphocytosis.  He is doing very well and has really no complaints currently.  He says he is had no heartburn, abdominal pain, dysphagia.  His weight is fairly stable there is been no nausea or vomiting or fever.  He typically moves his bowels about every day although sometimes he can get constipated.  Overall he feels in general well except for arthritis.  Blood work on 6/11/2024 revealed a hemoglobin 11.5, WBC of 7.6 and platelet count 231,000.  MCV is 101.6.  B12 is 561, folate is greater than 24.  Sodium 141 potassium 4.3 BUN 51 creatinine 2.28.  AST 19, ALT 14, alk phosphatase 102, total bili 0.5.  Albumin is 4.4.  A1c is 6.  Blood work on 12/9/2023 revealed a hemoglobin 9.7, WBC 9.9 and platelet count 239,000.  Sodium 139 potassium 4 creatinine 2.08.  Alcohol level is 75.  Blood work on 8/15/2023 revealed an AST 20 ALT 13 alkaline phosphatase 90 total bili 0.5 PT/INR is 1.27.  TSH is 4.84